# Patient Record
Sex: MALE | Race: BLACK OR AFRICAN AMERICAN | NOT HISPANIC OR LATINO | Employment: STUDENT | URBAN - METROPOLITAN AREA
[De-identification: names, ages, dates, MRNs, and addresses within clinical notes are randomized per-mention and may not be internally consistent; named-entity substitution may affect disease eponyms.]

---
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
## 2022-01-05 ENCOUNTER — OFFICE VISIT (OUTPATIENT)
Dept: OBGYN CLINIC | Facility: OTHER | Age: 22
End: 2022-01-05
Payer: COMMERCIAL

## 2022-01-05 VITALS
SYSTOLIC BLOOD PRESSURE: 128 MMHG | WEIGHT: 220 LBS | HEART RATE: 72 BPM | DIASTOLIC BLOOD PRESSURE: 82 MMHG | HEIGHT: 68 IN | BODY MASS INDEX: 33.34 KG/M2

## 2022-01-05 DIAGNOSIS — Z98.890 S/P ACL RECONSTRUCTION: Primary | ICD-10-CM

## 2022-01-05 PROCEDURE — 3008F BODY MASS INDEX DOCD: CPT | Performed by: ORTHOPAEDIC SURGERY

## 2022-01-05 PROCEDURE — 99213 OFFICE O/P EST LOW 20 MIN: CPT | Performed by: ORTHOPAEDIC SURGERY

## 2022-01-05 PROCEDURE — 1036F TOBACCO NON-USER: CPT | Performed by: ORTHOPAEDIC SURGERY

## 2022-01-05 NOTE — PROGRESS NOTES
Orthopaedic Surgery - Office Note  Kadi Castellano (23 y o  male)   : 2000   MRN: [id number]  Encounter Date: 2022    Chief Complaint   Patient presents with    Left Knee - Pain, Follow-up       Assessment / Plan  S/p left knee ACL reconstruction with BTB autograft and medial meniscus repair (DOS: 21)  Progressing well  · Continue PT and home therapy: ACL protocol, isokinetic quad strengthening  · Continue home stretching and strengthening exercises  · Ok to continue foodball practice; however, continue to avoid contact until PT evaluation   · May use brace as needed   · Return following PT functional evaluation for final determination of clearance status     History of Present Illness  Kadi Castellano is a 25 y o  male who presents f/u S/p left knee ACL reconstruction with BTB autograft and medial meniscus repair (DOS: 21)  Pt reports left knee is feeling well at this point  He notes he is able to complete his workouts without issue  He is using a support brace occasionally but is not reliant on this  Currently he has had limited formal therapy sessions due to his work schedule but has continued team strength workout  He is scheduled to begin agility workouts in the spring  Review of Systems  Pertinent items are noted in HPI  All other systems were reviewed and are negative  Physical Exam  /82 (BP Location: Right arm, Patient Position: Sitting, Cuff Size: Adult)   Pulse 72   Ht 5' 8" (1 727 m)   Wt 99 8 kg (220 lb)   BMI 33 45 kg/m²   Cons: Appears well  No apparent distress  Psych: Alert  Oriented x3  Mood and affect normal   Eyes: PERRLA, EOMI  Resp: Normal effort  No audible wheezing or stridor  CV: Palpable pulse  No discernable arrhythmia  No LE edema  Lymph:  No palpable cervical, axillary, or inguinal lymphadenopathy  Skin: Warm  No palpable masses  No visible lesions  Neuro: Normal muscle tone  Normal and symmetric DTR's       Left Knee Exam  Alignment:  Normal knee alignment  Inspection:  No swelling  No edema  No erythema  Palpation:  No tenderness  ROM:  Knee Extension 0  Knee Flexion 135  Strength:  5/5 quadriceps and hamstrings  Stability:  No objective knee instability  Stable Varus / Valgus stress, Lachman, and Posterior drawer  Tests:  (-) Dominik  Patella:  Patella tracks centrally without crepitus  Neurovascular:  Sensation intact in DP/SP/Hurley/Sa/T nerve distributions  2+ DP & PT pulses  Gait:  Normal     Studies Reviewed  No studies to review    Procedures  No procedures today  Medical, Surgical, Family, and Social History  The patient's medical history, family history, and social history, were reviewed and updated as appropriate      Past Medical History:   Diagnosis Date    Anxiety     "due to surgery"    Left knee injury     "playing football at practice"    Left knee pain     occas    Wears glasses     and also contacts       Past Surgical History:   Procedure Laterality Date    NO PAST SURGERIES      NM KNEE SCOPE,AID ANT CRUCIATE REPAIR Left 1/21/2021    Procedure: ARTHROSCOPIC RECONSTRUCTION ANTERIOR CRUCIATE LIGAMENT (ACL) WITH BTB AUTOGRAFT; MENISCUS REPAIR;  Surgeon: Luis Rodriguez MD;  Location: [facility] OR;  Service: Orthopedics       Family History   Problem Relation Age of Onset    No Known Problems Mother     No Known Problems Father     Breast cancer Maternal Grandmother        Social History     Occupational History    Not on file   Tobacco Use    Smoking status: Never Smoker    Smokeless tobacco: Never Used   Vaping Use    Vaping Use: Never used   Substance and Sexual Activity    Alcohol use: No    Drug use: No    Sexual activity: Not on file       Allergies   Allergen Reactions    Nuts - Food Allergy      almonds    Other Itching     Annotation - [id number]: peaches only  Persimmon- itching    Prunus Persica Hives     Peaches  Also Chick Peas,,  Hummus, mouth itching       No current outpatient medications on file        Didi Worley MD

## 2022-01-10 NOTE — PROGRESS NOTES
PT Evaluation     Today's date: 1/10/2022  Patient name: Gwen Sanchez  : 2000  MRN: [id number]  Referring provider: Courtney Hutchison MD  Dx: No diagnosis found  Assessment  Assessment details: Gwen Sanchez is a pleasant 25 y o  male who presents with ***  The primary movement problem is *** resulting in *** and limiting his ability to {skfunction:96442}  {referral:90065}  I expect he will ***  The patient's greatest concerns are {Concerns:20102::"worry over not knowing what's wrong","fear of not being able to keep active"}  Problem List:  1)   2)    Etiologic factors include {causes; back pain:05360}  Prognosis details: Positive prognostic indicators include {PRECAUTIONS:46776::"positive attitude toward recovery"}  Negative prognostic indicators include {PRECAUTIONS:30900::"chronicity of symptoms","anxiety","hypertension","high symptom irritability","obesity"}  Goals  Patient will be independent with home exercise program    Patient will be able to manage symptoms independently  Subjective Evaluation    History of Present Illness  Mechanism of injury: Patient presents for PT evaluation ~ 1 year post op ACLR  Admits he was not as consistent with PT as he should have been during his rehab process  Also, reports lack of ability to complete home program  States at his most recent follow up with orthopedics he was referred back to PT as he was not compliant with instructions to continue PT at his prior appointments           Objective           Precautions: ACLR      Manuals                                                                 Neuro Re-Ed                                                                                                        Ther Ex                                                                                                                     Ther Activity                                       Gait Training Modalities

## 2022-01-11 ENCOUNTER — EVALUATION (OUTPATIENT)
Dept: PHYSICAL THERAPY | Facility: OTHER | Age: 22
End: 2022-01-11
Payer: COMMERCIAL

## 2022-01-11 DIAGNOSIS — Z98.890 S/P ACL RECONSTRUCTION: ICD-10-CM

## 2022-01-11 PROCEDURE — 97110 THERAPEUTIC EXERCISES: CPT | Performed by: PHYSICAL THERAPIST

## 2022-01-11 PROCEDURE — 97162 PT EVAL MOD COMPLEX 30 MIN: CPT | Performed by: PHYSICAL THERAPIST

## 2022-01-11 NOTE — PROGRESS NOTES
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, glut, strength, secondary to limited rehab (PT/AT, and HEP) following post operative ACLR  Joelle Gray demonstrates decreased quad strength on isokinetic testing, and mild valgus, and trunk lean with hop testing  Joelle Gray 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 from returning to PT to address the deficits listed above to help ensure a safe return to sport and help to prevent future injury  The primary movement problem is knee pain with movement coordination impairments limiting his ability to exercise or recreation and squat to  objects from the floor  No further referral appears necessary at this time based upon examination results  I expect he will respond well to PT intervention if compliance to HEP and attendance in regular PT sessions improves  The patient's greatest concerns are fear of not being able to keep active and future ill health (and wanting to prevent it)  Problem List:  1) decreased quad, glut, LE strength   2) knee pain  Impairments: abnormal or restricted ROM, activity intolerance, impaired physical strength, lacks appropriate home exercise program and pain with function     Impairments: abnormal gait, abnormal muscle tone, abnormal or restricted ROM, activity intolerance, impaired physical strength, lacks appropriate home exercise program, pain with function and poor body mechanics  Prognosis details: Positive prognostic indicators include positive attitude toward recovery   Negative prognostic indicators include chronicity of symptoms, high symptom irritability, lack of compliance with prior PT  Goals  STG's to be achieved in 4 weeks:  1) Patient will demonstrate normal pain free AROM of L knee  2) Patient will improve total work by 100# on isokinetic testing  3) Patient will be compliant with strength and conditioning at minimum 2 x a week in addition to PT      LTG's to be achieved in 8 weeks:  1) Patient will demonstrate normal LE mechanics with SL hopping  2) Patient will report no greater than 2/10 knee pain with deep squatting  3) Patient will report no greater than 2/10 knee pain with running, agility activity  4) Patient will score 80 or greater on FOTO  Plan  Frequency: 2x week  Duration in weeks: 8        Subjective Evaluation    History of Present Illness  Mechanism of injury: Patient presents for PT evaluation ~ 1 year post op ACLR  Admits he was not as consistent with PT as he could have been during his rehab process  Also, reports lack of ability to complete home program  States 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, limiting his ability to attend PT, AT or strength and conditioning team sessions  States at his most recent follow up with orthopedics he was referred back to PT as he was not compliant with instructions to continue PT at his prior appointments       Pain  Current pain ratin  At best pain ratin  At worst pain ratin    Patient Goals  Patient goals for therapy: increased strength, return to sport/leisure activities and increased motion          Objective     Functional Assessment      6M Timed Hop   Left:   Trial 1: 2 seconds  Trial 2: 2 seconds  Trial 3: 2 seconds  Right:   Trial 1: 2 seconds  Trial 2: 3 seconds  Trial 3: 2 seconds  Crossover Hop    Left:   Trial 1: 161 seconds   Trial 2: 161 seconds   Trial 3: 163 seconds   Right:   Trial 1: 168 seconds   Trial 2: 168 seconds   Trial 3: 172 seconds   Single hip distance   Left:   Trial 1: 48 seconds  Trial 2: 52 seconds  Trial 2: 48 seconds  Right:   Trial 1: 72 seconds  Trial 2: 60 seconds  Trial 3: 65 seconds  Triple hop distance   Left:   Trial 1: 156 seconds  Trial 2 156 seconds  Trial 3: 161 seconds  Right:   Trial 1: 168 seconds  Trial 2: 161 seconds  Trial 3: 161 seconds    General Comments:      Knee Comments  Trunk lean with all hop testing, however, demonstrates little valgus, and excessive pronation    Isokinetic testing- demonstrates 20-15% quad deficit at 60pps, 180, and 300, 15-10% hamstring deficit at this same speeds                Precautions: ACLR       Manuals 1/11                                                                Neuro Re-Ed             circuit Surge  Front squat, alt lunge, RDL  10 ea 3x              Split squat jumps             Agility ladder                                                                 Ther Ex                                                                                                                     Ther Activity                                       Gait Training                                       Modalities

## 2022-01-14 ENCOUNTER — OFFICE VISIT (OUTPATIENT)
Dept: PHYSICAL THERAPY | Facility: OTHER | Age: 22
End: 2022-01-14
Payer: COMMERCIAL

## 2022-01-14 DIAGNOSIS — Z98.890 S/P ACL RECONSTRUCTION: Primary | ICD-10-CM

## 2022-01-14 PROCEDURE — 97530 THERAPEUTIC ACTIVITIES: CPT | Performed by: PHYSICAL THERAPIST

## 2022-01-14 PROCEDURE — 97110 THERAPEUTIC EXERCISES: CPT | Performed by: PHYSICAL THERAPIST

## 2022-01-14 PROCEDURE — 97112 NEUROMUSCULAR REEDUCATION: CPT | Performed by: PHYSICAL THERAPIST

## 2022-01-14 NOTE — PROGRESS NOTES
Daily Note     Today's date: 2022  Patient name: Liliana Arriaga  : 2000  MRN: [id number]  Referring provider: Chuck Bauman MD  Dx:   Encounter Diagnosis     ICD-10-CM    1  S/P ACL reconstruction  G30 155        Start Time: 447  Stop Time:   Total time in clinic (min): 55 minutes  1 on 1 for entirety    Subjective: Patient reports he was a little sore after resuming PT last visit  Objective: See treatment diary below      Assessment: Tolerated treatment well  Patient demonstrated fatigue post treatment, exhibited good technique with therapeutic exercises and 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 from continued PT  Patient very easily fatigued with resuming cardio, agility and higher level strengthening this visit  However, able to demonstrate improved short bouts of power with frequent rest breaks  Soreness noted post plyometric activity  Reporting he felt as though his knee needed to crack  Relieved post stretching  Plan: Continue per plan of care        Precautions: ACLR       Manuals                                                                Neuro Re-Ed             circuit Surge  Front squat, alt lunge, RDL  10 ea 3x   Surge   Front squat  Alt lunge fwd  Curtsy lunge b/l    All 10 x    3x           Split squat/ jumps  10 split squats 11# plates  3 split squat jumps    3x           backsquat                                                                   Ther Ex             bike  10'            Calf stretch                                                                                           Ther Activity             Active warm up, tin soldier, piriformis stretch, skips  2 laps ea           circuit blazepod agility ladder  backsquat bar+ 45 x 10    3x                         Gait Training                                       Modalities

## 2022-01-18 ENCOUNTER — OFFICE VISIT (OUTPATIENT)
Dept: PHYSICAL THERAPY | Facility: OTHER | Age: 22
End: 2022-01-18
Payer: COMMERCIAL

## 2022-01-18 DIAGNOSIS — Z98.890 S/P ACL RECONSTRUCTION: Primary | ICD-10-CM

## 2022-01-18 PROCEDURE — 97112 NEUROMUSCULAR REEDUCATION: CPT

## 2022-01-18 PROCEDURE — 97110 THERAPEUTIC EXERCISES: CPT

## 2022-01-18 NOTE — PROGRESS NOTES
Daily Note     Today's date: 2022  Patient name: Gwen Sanchez  : 2000  MRN: [id number]  Referring provider: Courtney Hutchison MD  Dx:   Encounter Diagnosis     ICD-10-CM    1  S/P ACL reconstruction  [id number]                     Subjective: Pt states he was sore from his last PT session  Objective: See treatment diary below      Assessment: Tolerated treatment well with no increase in pain  Pt demonstrates good from with squat exercises  Pt is challenged with SLS strength seen with lunges  Pt required VC for glut  Patient 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 from continued PT  Ogden Organ Plan: Continue per plan of care        Precautions: ACLR       Manuals                                            Neuro Re-Ed         Superset    30" side plank ea; 30" mountain climbers x 3       Superset    3 x 10 ea DB 50# box squats; reverse lunges with BB + 22# plates          Supersets   3 x 10  Split  Squats; SL dead lift        circuit Surge  Front squat, alt lunge, RDL  10 ea 3x   Surge   Front squat  Alt lunge fwd  Curtsy lunge b/l    All 10 x    3x          Split squat/ jumps  10 split squats 11# plates  3 split squat jumps    3x 3 split squat jumps   X 3       backsquat     NV       X walks    MTB   2 laps        leg press pyramid    190#, 170#, 150#, 130#,  110#,  130#,   150#,  170#,  190#  to failure ea                        Ther Ex         bike  10'  10'       Calf stretch                                                               Ther Activity         Active warm up, tin soldier, piriformis stretch, skips  2 laps ea 2 laps ea       circuit blazepod agility ladder  backsquat bar+ 45 x 10    3x                 Gait Training                           Modalities

## 2022-01-21 ENCOUNTER — OFFICE VISIT (OUTPATIENT)
Dept: PHYSICAL THERAPY | Facility: OTHER | Age: 22
End: 2022-01-21
Payer: COMMERCIAL

## 2022-01-21 DIAGNOSIS — Z98.890 S/P ACL RECONSTRUCTION: Primary | ICD-10-CM

## 2022-01-21 PROCEDURE — 97112 NEUROMUSCULAR REEDUCATION: CPT | Performed by: PHYSICAL THERAPIST

## 2022-01-21 PROCEDURE — 97110 THERAPEUTIC EXERCISES: CPT | Performed by: PHYSICAL THERAPIST

## 2022-01-21 NOTE — PROGRESS NOTES
Daily Note     Today's date: 2022  Patient name: Orestes Mcclendon  : 2000  MRN: [id number]  Referring provider: Katharine Restrepo MD  Dx:   Encounter Diagnosis     ICD-10-CM    1  S/P ACL reconstruction  Z98 890                   1 on 1 from 5122-5604, not billed remainder    Subjective: Pt reports muscle soreness from last session  Denies any knee pain  Objective: See treatment diary below      Assessment: Tolerated treatment well with no increase in pain  Patient 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 from continued PT  Continued with comprehensive program including glut, core strengthening, plyometric and agility loading of his post-operative knee with fatigue  Plan: Continue per plan of care        Precautions: ACLR       Manuals                                          Neuro Re-Ed         Superset    30" side plank ea; 30" mountain climbers x 3  30" side plank from knee with hip abd  30" mountain climber  X 4     Superset    3 x 10 ea DB 50# box squats; reverse lunges with BB + 22# plates          Supersets   3 x 10  Split  Squats; SL dead lift        circuit Surge  Front squat, alt lunge, RDL  10 ea 3x   Surge   Front squat  Alt lunge fwd  Curtsy lunge b/l    All 10 x    3x     Surge  Front squat, alt lunge, RDL  10 ea 3x     Split squat/ jumps  10 split squats 11# plates  3 split squat jumps    3x 3 split squat jumps   X 3       backsquat     NV  Bar +45   4 x 10      X walks    MTB   2 laps        leg press pyramid    190#, 170#, 150#, 130#,  110#,  130#,   150#,  170#,  190#  to failure ea 190#, 170#, 150#, 130# 150#, 170#, 190# to failure ea                        Ther Ex         bike  10'  10'  10'      Calf stretch         Hamstring quad stretch    4 x 30" ea                                                  Ther Activity         Active warm up, tin soldier, piriformis stretch, skips  2 laps ea 2 laps ea  2 laps ea     circuit blazepod agility ladder  backsquat bar+ 45 x 10    3x Gait Training                           Modalities

## 2022-01-25 ENCOUNTER — OFFICE VISIT (OUTPATIENT)
Dept: PHYSICAL THERAPY | Facility: OTHER | Age: 22
End: 2022-01-25
Payer: COMMERCIAL

## 2022-01-25 DIAGNOSIS — Z98.890 S/P ACL RECONSTRUCTION: Primary | ICD-10-CM

## 2022-01-25 PROCEDURE — 97112 NEUROMUSCULAR REEDUCATION: CPT

## 2022-01-25 PROCEDURE — 97530 THERAPEUTIC ACTIVITIES: CPT

## 2022-01-25 PROCEDURE — 97140 MANUAL THERAPY 1/> REGIONS: CPT

## 2022-01-25 PROCEDURE — 97110 THERAPEUTIC EXERCISES: CPT

## 2022-01-25 NOTE — PROGRESS NOTES
Daily Note     Today's date: 2022  Patient name: Quincy Cortez  : 2000  MRN: [id number]  Referring provider: Fausto Holly MD  Dx:   Encounter Diagnosis     ICD-10-CM    1  S/P ACL reconstruction  Z98 890                    1 on 1 PTA       Subjective: Pt reports muscle soreness from last session  Denies any knee pain  Objective: See treatment diary below      Assessment: Tolerated treatment well with no increase in pain  Patient able to successfully complete circuits involving strength, speed and light power movements  VC required for power movements mainly to correct motor patterns, but no problem with resistance  and  Patient 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 from 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  Plan: Continue per plan of care        Precautions: ACLR       Manuals     MFD     MP                               Neuro Re-Ed         Superset    30" side plank ea; 30" mountain climbers x 3  30" side plank from knee with hip abd  30" mountain climber  X 4     Superset    3 x 10 ea DB 50# box squats; reverse lunges with BB + 22# plates          Supersets   3 x 10  Split  Squats; SL dead lift        circuit Surge  Front squat, alt lunge, RDL  10 ea 3x   Surge   Front squat  Alt lunge fwd  Curtsy lunge b/l    All 10 x    3x     Surge  Front squat, alt lunge, RDL  10 ea 3x     Split squat/ jumps  10 split squats 11# plates  3 split squat jumps    3x 3 split squat jumps   X 3       backsquat     NV  Bar +45   4 x 10      X walks    MTB   2 laps        leg press pyramid    190#, 170#, 150#, 130#,  110#,  130#,   150#,  170#,  190#  to failure ea 190#, 170#, 150#, 130# 150#, 170#, 190# to failure ea                        Ther Ex         bike  10'  10'  10'  10'    Calf stretch         Hamstring quad stretch    4 x 30" ea 4 x 30"    Quad stretch     4 x 30"                                        Ther Activity         Active warm up, tin soldier, piriformis stretch, skips  2 laps ea 2 laps ea  2 laps ea     circuit blazepod agility ladder  backsquat bar+ 45 x 10    3x    5x    Leg Press 240-270lbs 5x    Curve Sprint x 15s    Russian HSC x 10    1-3 min rest          Circuit 2     3x  Iso Bengali SS 25# x1 ea     5 Max effort squat jump    8 HPC with Barbell     Gait Training                           Modalities         EPAT     Quad, HS small gold head    2000 pulses ea

## 2022-01-28 ENCOUNTER — OFFICE VISIT (OUTPATIENT)
Dept: PHYSICAL THERAPY | Facility: OTHER | Age: 22
End: 2022-01-28
Payer: COMMERCIAL

## 2022-01-28 DIAGNOSIS — Z98.890 S/P ACL RECONSTRUCTION: Primary | ICD-10-CM

## 2022-01-28 PROCEDURE — 97112 NEUROMUSCULAR REEDUCATION: CPT

## 2022-01-28 PROCEDURE — 97140 MANUAL THERAPY 1/> REGIONS: CPT

## 2022-01-28 NOTE — PROGRESS NOTES
Daily Note     Today's date: 2022  Patient name: Margot Tam  : 2000  MRN: [id number]  Referring provider: Cj Craig MD  Dx:   Encounter Diagnosis     ICD-10-CM    1  S/P ACL reconstruction  [id number]                   Subjective: Patient reports mild soreness since LV  Objective: See treatment diary below      Assessment: Tolerated treatment well  Patient demonstrated good power and control throughout session  He continues to benefit from MFD for restriction release  Patient demonstrated fatigue post session and 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 from continued PT  Plan: Continue per plan of care         Precautions: ACLR       Manuals    MFD     MP MSB                              Neuro Re-Ed         Superset    30" side plank ea; 30" mountain climbers x 3  30" side plank from knee with hip abd  30" mountain climber  X 4     Superset    3 x 10 ea DB 50# box squats; reverse lunges with BB + 22# plates          Supersets   3 x 10  Split  Squats; SL dead lift        circuit Surge  Front squat, alt lunge, RDL  10 ea 3x   Surge   Front squat  Alt lunge fwd  Curtsy lunge b/l    All 10 x    3x     Surge  Front squat, alt lunge, RDL  10 ea 3x     Split squat/ jumps  10 split squats 11# plates  3 split squat jumps    3x 3 split squat jumps   X 3       backsquat     NV  Bar +45   4 x 10      X walks    MTB   2 laps        leg press pyramid    190#, 170#, 150#, 130#,  110#,  130#,   150#,  170#,  190#  to failure ea 190#, 170#, 150#, 130# 150#, 170#, 190# to failure ea                        Ther Ex         bike  10'  10'  10'  10' 10'   Calf stretch         Hamstring quad stretch    4 x 30" ea 4 x 30" 4 x 30"   Quad stretch     4 x 30" 4 x 30"                                       Ther Activity         Active warm up, tin soldier, piriformis stretch, skips  2 laps ea 2 laps ea  2 laps ea     circuit blazepod agility ladder  backsquat bar+ 45 x 10    3x    5x    Leg Press 240-270lbs 5x    Curve Sprint x 15s    Russian HSC x 10    1-3 min rest       5x    Leg Press 240-270lbs 5x    Curve Sprint x 15s    Russian HSC x 10    1-3 min rest   Circuit 2     3x  Iso Swedish SS 25# x1 ea     5 Max effort squat jump    8 HPC with Barbell  3x  Iso Swedish SS 25# x1 ea     5 Max effort squat jump    HS stool scoots 3 laps   Gait Training                           Modalities         EPAT     Quad, HS small gold head    2000 pulses ea  Quad, HS small gold head    2000 pulses ea

## 2022-02-01 ENCOUNTER — OFFICE VISIT (OUTPATIENT)
Dept: PHYSICAL THERAPY | Facility: OTHER | Age: 22
End: 2022-02-01
Payer: COMMERCIAL

## 2022-02-01 DIAGNOSIS — Z98.890 S/P ACL RECONSTRUCTION: Primary | ICD-10-CM

## 2022-02-01 PROCEDURE — 97112 NEUROMUSCULAR REEDUCATION: CPT | Performed by: PHYSICAL THERAPIST

## 2022-02-01 PROCEDURE — 97110 THERAPEUTIC EXERCISES: CPT | Performed by: PHYSICAL THERAPIST

## 2022-02-01 PROCEDURE — 97140 MANUAL THERAPY 1/> REGIONS: CPT | Performed by: PHYSICAL THERAPIST

## 2022-02-01 NOTE — PROGRESS NOTES
Daily Note     Today's date: 2022  Patient name: Emiliana Turner  : 2000  MRN: [id number]  Referring provider: Debra Chavis MD  Dx:   Encounter Diagnosis     ICD-10-CM    1  S/P ACL reconstruction  [id number]                   Subjective: Patient reports some joint line soreness with impact activity  Reports overall muscle soreness from workout on his own prior this week  Objective: See treatment diary below      Assessment: Tolerated treatment well  Patient continues to be challenged with higher level activity  Demonstrating tightness in b/l hamstrings and mild joint line soreness of medial joint line  Joy strengthening this visit due to patient completing lower body program on his own  Therefore, focused on core, and plyometric, agility activity  Plan: Continue per plan of care         Precautions: ACLR       Manuals    MFD    MP MSB EB hamstring                              Neuro Re-Ed         Superset   30" side plank ea; 30" mountain climbers x 3  30" side plank from knee with hip abd  30" mountain climber  X 4   1' slideboard  10" mountain climber  10 surge front squats  3 laps side shuffle hurdles  3 x     Superset   3 x 10 ea DB 50# box squats; reverse lunges with BB + 22# plates        3 laps bear crawl    10 box jumps, 18"    12 pistol squats    3 x   Supersets  3 x 10  Split  Squats; SL dead lift         circuit Surge   Front squat  Alt lunge fwd  Curtsy lunge b/l    All 10 x    3x     Surge  Front squat, alt lunge, RDL  10 ea 3x      Split squat/ jumps 10 split squats 11# plates  3 split squat jumps    3x 3 split squat jumps   X 3        backsquat  NV  Bar +45   4 x 10       X walks   MTB   2 laps         leg press pyramid   190#, 170#, 150#, 130#,  110#,  130#,   150#,  170#,  190#  to failure ea 190#, 170#, 150#, 130# 150#, 170#, 190# to failure ea                         Ther Ex         bike 10'  10'  10'  10' 10' 5'    Calf stretch         Hamstring quad stretch   4 x 30" ea 4 x 30" 4 x 30" 4 x 30"   Quad stretch    4 x 30" 4 x 30" 4 x 30"   Sciatic nerve glide      3 x 10   Supine sciatic nerve glide      10 x                     Ther Activity         Active warm up, tin soldier, piriformis stretch, skips 2 laps ea 2 laps ea  2 laps ea      circuit    5x    Leg Press 240-270lbs 5x    Curve Sprint x 15s    Russian HSC x 10    1-3 min rest       5x    Leg Press 240-270lbs 5x    Curve Sprint x 15s    Russian HSC x 10    1-3 min rest    Circuit 2    3x  Iso Macanese SS 25# x1 ea     5 Max effort squat jump    8 HPC with Barbell  3x  Iso Macanese SS 25# x1 ea     5 Max effort squat jump    HS stool scoots 3 laps    Gait Training                           Modalities         EPAT    Quad, HS small gold head    2000 pulses ea  Quad, HS small gold head    2000 pulses ea   HS gold 2000 pulse 3 8

## 2022-02-04 ENCOUNTER — EVALUATION (OUTPATIENT)
Dept: PHYSICAL THERAPY | Facility: OTHER | Age: 22
End: 2022-02-04
Payer: COMMERCIAL

## 2022-02-04 PROCEDURE — 97110 THERAPEUTIC EXERCISES: CPT | Performed by: PHYSICAL THERAPIST

## 2022-02-04 NOTE — PROGRESS NOTES
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 after resuming formal PT 1 month ago with decreased quad, glut, strength, secondary to limited rehab (PT/AT, and HEP) following post operative ACL reconstruction  At this time he demonstrates improvement ins isokinetic testing results showing 10% greater quad strength on his involved versus uninvolved LE  He also demonstrates improved neuromuscular control with hop testing and hop testing within 90% of the uninvolved LE  At this time he demonstrates compliance with strength and conditioning and regularly attending AT rehab appointments on time  Discussed d/c to strength and conditioning at minimum of 2 x a week and 2x a week sport progression with team AT  I am happy to follow up with Melanie Méndez should any future problems arise  Impairments: abnormal gait, abnormal muscle tone, abnormal or restricted ROM, activity intolerance, impaired physical strength, lacks appropriate home exercise program, pain with function and poor body mechanics  Prognosis details: Positive prognostic indicators include positive attitude toward recovery  Negative prognostic indicators include chronicity of symptoms, high symptom irritability, lack of compliance with prior PT  Goals  STG's to be achieved in 4 weeks:  1) Patient will demonstrate normal pain free AROM of L knee   - met  2) Patient will improve total work by 100# on isokinetic testing  -met  3) Patient will be compliant with strength and conditioning at minimum 2 x a week in addition to PT  - met    LTG's to be achieved in 8 weeks:  1) Patient will demonstrate normal LE mechanics with SL hopping  - met  2) Patient will report no greater than 2/10 knee pain with deep squatting  -met  3) Patient will report no greater than 2/10 knee pain with running, agility activity  - partially met   4) Patient will score 80 or greater on FOTO  - met             Subjective Evaluation    History of Present Illness  Mechanism of injury: Patient presents for PT evaluation ~ 1 year post op ACLR  Admits he was not as consistent with PT as he could have been during his rehab process  Also, reports lack of ability to complete home program  States 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, limiting his ability to attend PT, AT or strength and conditioning team sessions  States at his most recent follow up with orthopedics he was referred back to PT as he was not compliant with instructions to continue PT at his prior appointments       Pain  Current pain ratin  At best pain ratin  At worst pain ratin    Patient Goals  Patient goals for therapy: increased strength, return to sport/leisure activities and increased motion          Objective     Functional Assessment      6M Timed Hop   Left:   Trial 1: 2 seconds  Trial 2: 2 seconds  Trial 3: 2 seconds  Right:   Trial 1: 2 seconds  Trial 2: 2 seconds  Trial 3: 2 seconds  Crossover Hop    Left:   Trial 1: 165 seconds   Trial 2: 162 seconds   Trial 3: 162 seconds   Right:   Trial 1: 168 seconds   Trial 2: 180 seconds   Trial 3: 180 seconds   Single hip distance   Left:   Trial 1: 68 seconds  Trial 2: 72 seconds  Trial 2: 70 seconds  Right:   Trial 1: 72 seconds  Trial 2: 70 seconds  Trial 3: 72 seconds  Triple hop distance   Left:   Trial 1: 180 seconds  Trial 2 186 seconds  Trial 3: 180 seconds  Right:   Trial 1: 192 seconds  Trial 2: 194 seconds  Trial 3: 192 seconds    General Comments:      Knee Comments  Trunk lean with all hop testing, however, demonstrates little valgus, and excessive pronation                 Precautions: ACLR     Manuals    MFD    MP MSB EB hamstring    isoketics 10   Hop testing       5             Neuro Re-Ed          Superset   30" side plank ea; 30" mountain climbers x 3  30" side plank from knee with hip abd  30" mountain climber  X 4   1' slideboard  10" mountain climber  10 surge front squats  3 laps side shuffle hurdles  3 x      Superset   3 x 10 ea DB 50# box squats; reverse lunges with BB + 22# plates        3 laps bear crawl    10 box jumps, 18"    12 pistol squats    3 x    Supersets  3 x 10  Split  Squats; SL dead lift          circuit Surge   Front squat  Alt lunge fwd  Curtsy lunge b/l    All 10 x    3x     Surge  Front squat, alt lunge, RDL  10 ea 3x       Split squat/ jumps 10 split squats 11# plates  3 split squat jumps    3x 3 split squat jumps   X 3         backsquat  NV  Bar +45   4 x 10        X walks   MTB   2 laps          leg press pyramid   190#, 170#, 150#, 130#,  110#,  130#,   150#,  170#,  190#  to failure ea 190#, 170#, 150#, 130# 150#, 170#, 190# to failure ea                            Ther Ex          bike 10'  10'  10'  10' 10' 5'  5'   Calf stretch       4 x 30"   Hamstring quad stretch   4 x 30" ea 4 x 30" 4 x 30" 4 x 30" 4 x 30"   Quad stretch    4 x 30" 4 x 30" 4 x 30" 4 x 30"   Sciatic nerve glide      3 x 10 3 x 10   Supine sciatic nerve glide      10 x 10 x 10"                       Ther Activity          Active warm up, tin soldier, piriformis stretch, skips 2 laps ea 2 laps ea  2 laps ea       circuit    5x    Leg Press 240-270lbs 5x    Curve Sprint x 15s    Russian HSC x 10    1-3 min rest       5x    Leg Press 240-270lbs 5x    Curve Sprint x 15s    Russian HSC x 10    1-3 min rest     Circuit 2    3x  Iso Korean SS 25# x1 ea     5 Max effort squat jump    8 HPC with Barbell  3x  Iso Korean SS 25# x1 ea     5 Max effort squat jump    HS stool scoots 3 laps     Gait Training                              Modalities          EPAT    Quad, HS small gold head    2000 pulses ea  Quad, HS small gold head    2000 pulses ea   HS gold 2000 pulse 3 8

## 2022-03-16 ENCOUNTER — OFFICE VISIT (OUTPATIENT)
Dept: OBGYN CLINIC | Facility: OTHER | Age: 22
End: 2022-03-16
Payer: COMMERCIAL

## 2022-03-16 VITALS
HEIGHT: 68 IN | BODY MASS INDEX: 33.34 KG/M2 | SYSTOLIC BLOOD PRESSURE: 136 MMHG | DIASTOLIC BLOOD PRESSURE: 81 MMHG | HEART RATE: 66 BPM | WEIGHT: 220 LBS

## 2022-03-16 DIAGNOSIS — S83.512D RUPTURE OF ANTERIOR CRUCIATE LIGAMENT OF LEFT KNEE, SUBSEQUENT ENCOUNTER: Primary | ICD-10-CM

## 2022-03-16 PROCEDURE — 99213 OFFICE O/P EST LOW 20 MIN: CPT | Performed by: ORTHOPAEDIC SURGERY

## 2022-03-16 PROCEDURE — 1036F TOBACCO NON-USER: CPT | Performed by: ORTHOPAEDIC SURGERY

## 2022-03-16 PROCEDURE — 3008F BODY MASS INDEX DOCD: CPT | Performed by: ORTHOPAEDIC SURGERY

## 2022-03-16 NOTE — PROGRESS NOTES
Orthopaedic Surgery - Office Note  Nahomy Martinez (89 y o  male)   : 2000   MRN: [id number]  Encounter Date: 3/16/2022    Chief Complaint   Patient presents with    Left Knee - Follow-up       Assessment / Plan  S/p left knee ACL reconstruction with BTB autograft and medial meniscus repair with good progression     · Activity as tolerated   · Fully cleared to return to sports, note given today   · ACL functional brace prn  · Patient did well with is isokentic testing and hop test  Return if symptoms worsen or fail to improve  History of Present Illness  Nahomy Martinez is a 25 y o  male who presents for follow up S/p left knee ACL reconstruction with BTB autograft and medial meniscus repair on 2021  He did return to PT and received final clearance passing his isokinetic testing and hop tests  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  He is wearing an ACL functional brace  He denies any instability  He continues to be happy with his overall surgical outcomes  Review of Systems  Pertinent items are noted in HPI  All other systems were reviewed and are negative  Physical Exam  /81   Pulse 66   Ht 5' 8" (1 727 m)   Wt 99 8 kg (220 lb)   BMI 33 45 kg/m²   Cons: Appears well  No apparent distress  Psych: Alert  Oriented x3  Mood and affect normal   Eyes: PERRLA, EOMI  Resp: Normal effort  No audible wheezing or stridor  CV: Palpable pulse  No discernable arrhythmia  No LE edema  Lymph:  No palpable cervical, axillary, or inguinal lymphadenopathy  Skin: Warm  No palpable masses  No visible lesions  Neuro: Normal muscle tone  Normal and symmetric DTR's  Left Knee Exam  Alignment:  Normal knee alignment  Inspection:  No swelling  No ecchymosis  Palpation:  No tenderness  No effusion  ROM:  Knee Extension 0  Knee Flexion 135  Strength:  Able to SLR without lag  Able to actively extend knee against gravity  Stability:  (-) Lachman   (-) Pivot-shift  Tests:  No pertinent positive or negative tests  Patella:  Normal patellar mobility  Neurovascular:  Sensation intact in DP/SP/Hurley/Sa/T nerve distributions  2+ DP & PT pulses  Gait:  Normal     Studies Reviewed  No studies to review    Procedures  No procedures today  Medical, Surgical, Family, and Social History  The patient's medical history, family history, and social history, were reviewed and updated as appropriate  Past Medical History:   Diagnosis Date    Anxiety     "due to surgery"    Left knee injury     "playing football at practice"    Left knee pain     occas    Wears glasses     and also contacts       Past Surgical History:   Procedure Laterality Date    NO PAST SURGERIES      NV KNEE SCOPE,AID ANT CRUCIATE REPAIR Left 1/21/2021    Procedure: ARTHROSCOPIC RECONSTRUCTION ANTERIOR CRUCIATE LIGAMENT (ACL) WITH BTB AUTOGRAFT; MENISCUS REPAIR;  Surgeon: Seble Mcqueen MD;  [facility];  Service: Orthopedics       Family History   Problem Relation Age of Onset    No Known Problems Mother     No Known Problems Father     Breast cancer Maternal Grandmother        Social History     Occupational History    Not on file   Tobacco Use    Smoking status: Never Smoker    Smokeless tobacco: Never Used   Vaping Use    Vaping Use: Never used   Substance and Sexual Activity    Alcohol use: No    Drug use: No    Sexual activity: Not on file       Allergies   Allergen Reactions    Nuts - Food Allergy      almonds    Other Itching     Annotation - [id number]: peaches only  Persimmon- itching    Prunus Persica Hives     Peaches  Also Chick Peas,,  Hummus, mouth itching       No current outpatient medications on file        Maritza Balloon    Scribe Attestation    I,:  Maritza Balloon am acting as a scribe while in the presence of the attending physician :       I,:  Lanny Dumont MD personally performed the services described in this documentation    as scribed in my presence :

## 2022-03-16 NOTE — LETTER
March 16, 2022     Patient: Margy Etienne   YOB: 2000   Date of Visit: 3/16/2022       To Whom it May Concern:    Margy Etienne is under my professional care  He was seen in my office on 3/16/2022  He may return to gym class or sports on 03/16/2022  no restrictions  If you have any questions or concerns, please don't hesitate to call           Sincerely,          Mark Goodpasture, MD        CC: No Recipients

## 2022-04-15 NOTE — PROGRESS NOTES
Daily Note     Today's date: 2021  Patient name: Jerome Haley  : 2000  MRN: [id number]  Referring provider: Oscar Van MD  Dx:   Encounter Diagnosis     ICD-10-CM    1  Rupture of anterior cruciate ligament of left knee, initial encounter  S83 512A        Start Time: 3902        1 on 1 with PT from [phone], not billed remainder    Subjective: Patient offers no complaints  Reports fatigue post PT as expected  Admits he could be more compliant with home program        Objective: See treatment diary below      Assessment: Tolerated treatment well  Initiated full weightbearing running on treadmill  Patient requiring cueing for improved hamstring kickback and quad drive forward with running  Patient easily fatigued from a cardiovascular standpoint  Patient continues to be easily fatigued with current strengthening program  Discussed adding strength and conditioning in to his home routine with campus at minimum 1-2 x a week  Patient instructed to hold agility and plyometrics with S&C  Patient also encourage to reproduce PT session (squat, leg ext curls, legpress) in the gym on campus at minimum 1 x a week  Patient demonstrated fatigue post treatment, exhibited good technique with therapeutic exercises and 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 from continued PT  Plan: Continue per plan of care           Precautions: ACL reconstruction, BPTB,  meniscal repair, smyth ROM to 90 first 4 weeks      Manuals    PROM         Quad stretch         IASTM     MP    Scar massage         KT tape         MFD         EPAT 86987 pulses plastic head 3 5        Neuro Re-Ed         squat      Surge 3 x 10    lunge      Surge 3 x 10    Lateral step over bosu ball         bosu ball  Squat and hold    3 x 1' ball toss     Mini squat         Step up         Legpress         SLS         Star slider         Lateral step down         [place] hamstring curls         Pistol squat with TRX  SL Box Squat 3 x 10  SL box squat 3 x 10 SL box squat 3 x 10      Single leg hip abd mira         Assisted Sissy Squats     Bungee 3 x 10    Back squat    3 x 10 bar     Bird dip  3 x 10, 30#    Ball drop blue MB 2 x 10     Reverse Lunge R + L, to lateral step up         [place] spilt squat 3 x 10 35# 3 x 10 35#       Step up reverse lunge 35# 3 x 10        Elevated quadruped pull through         Elevated bird dog Regular 3 x 8        blazepod  plank focus                  Ther Ex         Bike  10' 5'  5'  5'  5' 5'   Calf stretch heel propped   SB 4 x 30" SB 4 x 30" SB 4 x 30"    Quad stretch 4 x 30" 4 x 30"       Hamstring stretch 4 x 30" 4 x 30"       Heel slide         Ankle pumps         LAQ 90-60         HR, TR         Side Plank modified with hip abd               DL 2 x 1' fwd, lat   Ther Activity      2 x 15   Line jump         Squat, vertical  2 x 10     2' jog, 1' walk   TM         AlterG running XL short NP- Not working     60% 1' walk, 1 5' jog x5 65% 2' jog, 30 sec walk x 5 65% 2' jog, 30 sec walk x 5'      Vertical alt  2 x 10 8" step  2 x 10, 12"     Hopping   AP/ML 30s       circuit  RDL, walking lunge, goblet squat walking lunge- all 35# 3 x 10 reps ea   Black CLX side step    Black CLX reverse monster walk    Walking Lunge  2, 25# DB    30s Side Plank with hip ABD Split squat, 25# step up with knee drive, 54# bear crawls fwd/back 3x    3x  4/2/1 Surge Squat Max reps 20, 22    15s Iso [place] SS x2 ea   Leg    Power Step ups x 15 ea  leg Split squat- 10 x, RDL- 10 x, walking lunge 2 laps  3 x ea    Step up reverse lunge, curtsey lunge  Goblet squat all 10 rep    3x ea       Gait Training                           Modalities No

## 2022-07-27 ENCOUNTER — TELEPHONE (OUTPATIENT)
Dept: OTHER | Facility: OTHER | Age: 22
End: 2022-07-27

## 2022-07-27 DIAGNOSIS — Z13.0 ENCOUNTER FOR SICKLE-CELL SCREENING: Primary | ICD-10-CM

## 2022-07-27 NOTE — TELEPHONE ENCOUNTER
I can order it during his physical next month or does he need to get done now?      Dr Bobo Rodriguez

## 2022-07-27 NOTE — TELEPHONE ENCOUNTER
Patient of Dr Shameka Johnston calling stating he goes to [facility], and the [facility] is asking if they can submit records of sickle cell testing/results  He states he is unsure if hes ever had such testing done and is asking how to go about this   Please advise [phone]

## 2022-08-03 ENCOUNTER — APPOINTMENT (OUTPATIENT)
Dept: LAB | Facility: HOSPITAL | Age: 22
End: 2022-08-03
Payer: COMMERCIAL

## 2022-08-03 DIAGNOSIS — Z13.0 SCREENING FOR IRON DEFICIENCY ANEMIA: ICD-10-CM

## 2022-08-03 PROCEDURE — 85660 RBC SICKLE CELL TEST: CPT

## 2022-08-03 PROCEDURE — 36415 COLL VENOUS BLD VENIPUNCTURE: CPT

## 2022-08-05 LAB — SICKLE CELLS BLD QL SMEAR: NEGATIVE

## 2022-08-08 ENCOUNTER — OFFICE VISIT (OUTPATIENT)
Dept: FAMILY MEDICINE CLINIC | Facility: CLINIC | Age: 22
End: 2022-08-08
Payer: COMMERCIAL

## 2022-08-08 VITALS
DIASTOLIC BLOOD PRESSURE: 82 MMHG | BODY MASS INDEX: 31.07 KG/M2 | WEIGHT: 209.8 LBS | HEART RATE: 74 BPM | TEMPERATURE: 96.9 F | HEIGHT: 69 IN | OXYGEN SATURATION: 99 % | SYSTOLIC BLOOD PRESSURE: 138 MMHG | RESPIRATION RATE: 12 BRPM

## 2022-08-08 DIAGNOSIS — E66.09 CLASS 1 OBESITY DUE TO EXCESS CALORIES WITHOUT SERIOUS COMORBIDITY WITH BODY MASS INDEX (BMI) OF 31.0 TO 31.9 IN ADULT: ICD-10-CM

## 2022-08-08 DIAGNOSIS — R03.0 ELEVATED BLOOD PRESSURE READING: ICD-10-CM

## 2022-08-08 DIAGNOSIS — Z00.00 ANNUAL PHYSICAL EXAM: Primary | ICD-10-CM

## 2022-08-08 PROCEDURE — 99395 PREV VISIT EST AGE 18-39: CPT | Performed by: FAMILY MEDICINE

## 2022-08-08 PROCEDURE — 3725F SCREEN DEPRESSION PERFORMED: CPT | Performed by: FAMILY MEDICINE

## 2022-08-08 NOTE — PROGRESS NOTES
[address] [facility]    NAME: Gwen Sanchez  AGE: 23 y o  SEX: male  : 2000     DATE: 2022     Assessment and Plan:     Problem List Items Addressed This Visit        Other    Obesity     Diet and exercise discussed today         Elevated blood pressure reading     BP in 130-140/80-90 today   Patient is to cut back on his caffeine intake with red bull and to sleep 7-8 hours a night which he hasnt been sleeping well consistently   Also to get BP cuff over the counter and check it at home once a week and call me with readings  Refused blood work today             Other Visit Diagnoses     Annual physical exam    -  Primary          Immunizations and preventive care screenings were discussed with patient today  Appropriate education was printed on patient's after visit summary  Counseling:  Alcohol/drug use: discussed moderation in alcohol intake, the recommendations for healthy alcohol use, and avoidance of illicit drug use  Dental Health: discussed importance of regular tooth brushing, flossing, and dental visits  Injury prevention: discussed safety/seat belts, safety helmets, smoke detectors, carbon dioxide detectors, and smoking near bedding or upholstery  Sexual health: discussed sexually transmitted diseases, partner selection, use of condoms, avoidance of unintended pregnancy, and contraceptive alternatives  Exercise: the importance of regular exercise/physical activity was discussed  Recommend exercise 3-5 times per week for at least 30 minutes  BMI Counseling: Body mass index is 31 kg/m²  The BMI is above normal  Nutrition recommendations include decreasing portion sizes and encouraging healthy choices of fruits and vegetables  Exercise recommendations include moderate physical activity 150 minutes/week  No pharmacotherapy was ordered  Rationale for BMI follow-up plan is due to patient being overweight or obese  Depression Screening and Follow-up Plan: Patient was screened for depression during today's encounter  They screened negative with a PHQ-2 score of 0  No follow-ups on file  Chief Complaint:     Chief Complaint   Patient presents with    Physical Exam     Patient being seen for physical       History of Present Illness:     Adult Annual Physical   Patient here for a comprehensive physical exam  The patient reports no problems  Diet and Physical Activity  Diet/Nutrition: well balanced diet and consuming 3-5 servings of fruits/vegetables daily  Exercise: moderate cardiovascular exercise  Depression Screening  PHQ-2/9 Depression Screening    Little interest or pleasure in doing things: 0 - not at all  Feeling down, depressed, or hopeless: 0 - not at all  PHQ-2 Score: 0  PHQ-2 Interpretation: Negative depression screen       General Health  Sleep: sleeps well and gets 7-8 hours of sleep on average  Hearing: normal - bilateral   Vision: goes for regular eye exams  Dental: regular dental visits   Health  History of STDs?: no      Review of Systems:     Review of Systems   Constitutional: Negative  HENT: Negative  Eyes: Negative  Respiratory: Negative  Cardiovascular: Negative  Gastrointestinal: Negative  Endocrine: Negative  Genitourinary: Negative  Musculoskeletal: Negative  Skin: Negative  Allergic/Immunologic: Negative  Neurological: Negative  Hematological: Negative  Psychiatric/Behavioral: Negative         Past Medical History:     Past Medical History:   Diagnosis Date    Anxiety     "due to surgery"    Left knee injury     "playing football at practice"    Left knee pain     occas    Wears glasses     and also contacts      Past Surgical History:     Past Surgical History:   Procedure Laterality Date    NO PAST SURGERIES      AR KNEE SCOPE,AID ANT CRUCIATE REPAIR Left 1/21/2021    Procedure: ARTHROSCOPIC RECONSTRUCTION ANTERIOR CRUCIATE LIGAMENT (ACL) WITH BTB AUTOGRAFT; MENISCUS REPAIR;  Surgeon: Anh Andrews MD;  Location: [facility];  Service: Orthopedics      Social History:     Social History     Socioeconomic History    Marital status: Single     Spouse name: None    Number of children: None    Years of education: None    Highest education level: None   Occupational History    None   Tobacco Use    Smoking status: Never Smoker    Smokeless tobacco: Never Used   Vaping Use    Vaping Use: Never used   Substance and Sexual Activity    Alcohol use: No    Drug use: No    Sexual activity: None   Other Topics Concern    None   Social History Narrative    None     Social Determinants of Health     Financial Resource Strain: Not on file   Food Insecurity: Not on file   Transportation Needs: Not on file   Physical Activity: Not on file   Stress: Not on file   Social Connections: Not on file   Intimate Partner Violence: Not on file   Housing Stability: Not on file      Family History:     Family History   Problem Relation Age of Onset    No Known Problems Mother     No Known Problems Father     Breast cancer Maternal Grandmother       Current Medications:     No current outpatient medications on file  No current facility-administered medications for this visit  Allergies: Allergies   Allergen Reactions    Nuts - Food Allergy      almonds    Other Itching     [facility] - [id number]: peaches only  Persimmon- itching    Prunus Persica Hives     Peaches  Also Chick Peas,,  Hummus, mouth itching      Physical Exam:     /82 (BP Location: Left arm, Patient Position: Sitting, Cuff Size: Standard)   Pulse 74   Temp (!) 96 9 °F (36 1 °C) (Tympanic)   Resp 12   Ht 5' 8 98" (1 752 m)   Wt 95 2 kg (209 lb 12 8 oz)   SpO2 99%   BMI 31 00 kg/m²     Physical Exam  Vitals and nursing note reviewed  Constitutional:       Appearance: Normal appearance  He is well-developed     HENT:      Head: Normocephalic and atraumatic  Right Ear: Tympanic membrane normal       Left Ear: Tympanic membrane normal       Nose: Nose normal    Eyes:      Pupils: Pupils are equal, round, and reactive to light  Cardiovascular:      Rate and Rhythm: Normal rate and regular rhythm  Pulmonary:      Effort: Pulmonary effort is normal       Breath sounds: Normal breath sounds  Abdominal:      Palpations: Abdomen is soft  Musculoskeletal:         General: Normal range of motion  Cervical back: Normal range of motion and neck supple  Skin:     General: Skin is warm  Capillary Refill: Capillary refill takes less than 2 seconds  Neurological:      General: No focal deficit present  Mental Status: He is alert and oriented to person, place, and time     Psychiatric:         Mood and Affect: Mood normal          Behavior: Behavior normal           Damon Gallo MD   [address] [facility]

## 2022-08-08 NOTE — PATIENT INSTRUCTIONS

## 2022-08-08 NOTE — ASSESSMENT & PLAN NOTE
BP in 130-140/80-90 today   Patient is to cut back on his caffeine intake with red bull and to sleep 7-8 hours a night which he hasnt been sleeping well consistently   Also to get BP cuff over the counter and check it at home once a week and call me with readings  Refused blood work today

## 2023-05-02 ENCOUNTER — OFFICE VISIT (OUTPATIENT)
Dept: FAMILY MEDICINE CLINIC | Facility: CLINIC | Age: 23
End: 2023-05-02

## 2023-05-02 VITALS
OXYGEN SATURATION: 98 % | DIASTOLIC BLOOD PRESSURE: 84 MMHG | BODY MASS INDEX: 30.24 KG/M2 | HEART RATE: 72 BPM | HEIGHT: 69 IN | WEIGHT: 204.2 LBS | TEMPERATURE: 97.9 F | SYSTOLIC BLOOD PRESSURE: 122 MMHG

## 2023-05-02 DIAGNOSIS — J02.9 SORE THROAT: ICD-10-CM

## 2023-05-02 DIAGNOSIS — B34.9 VIRAL ILLNESS: ICD-10-CM

## 2023-05-02 DIAGNOSIS — R43.0 LOSS OF SENSE OF SMELL: Primary | ICD-10-CM

## 2023-05-02 LAB — S PYO AG THROAT QL: NEGATIVE

## 2023-05-02 RX ORDER — BROMPHENIRAMINE MALEATE, PSEUDOEPHEDRINE HYDROCHLORIDE, AND DEXTROMETHORPHAN HYDROBROMIDE 2; 30; 10 MG/5ML; MG/5ML; MG/5ML
5 SYRUP ORAL 4 TIMES DAILY PRN
Qty: 200 ML | Refills: 0 | Status: SHIPPED | OUTPATIENT
Start: 2023-05-02

## 2023-05-02 NOTE — PROGRESS NOTES
"Name: Rebeca Rodrigues      : 2000      MRN: [id number]  Encounter Provider: Samantha Diaz MD  Encounter Date: 2023   Encounter department: [facility]     1  Loss of sense of smell  -     COVID Only- Office Collect    2  Sore throat  -     POCT rapid strepA  -     Throat culture; Future    3  Viral illness  -     brompheniramine-pseudoephedrine-DM 30-2-10 MG/5ML syrup; Take 5 mL by mouth 4 (four) times a day as needed for cough         Subjective    loss of smell and taste this am     Sore Throat   This is a new problem  The current episode started in the past 7 days  There has been no fever  Associated symptoms include congestion and coughing  Pertinent negatives include no abdominal pain, diarrhea, drooling, ear discharge, ear pain, headaches, hoarse voice, plugged ear sensation, neck pain, stridor, swollen glands, trouble swallowing or vomiting  He has had no exposure to strep or mono  Review of Systems   HENT: Positive for congestion  Negative for drooling, ear discharge, ear pain, hoarse voice and trouble swallowing  Respiratory: Positive for cough  Negative for stridor  Gastrointestinal: Negative for abdominal pain, diarrhea and vomiting  Musculoskeletal: Negative for neck pain  Neurological: Negative for headaches  No current outpatient medications on file prior to visit  Objective     /84 (BP Location: Left arm, Patient Position: Sitting, Cuff Size: Standard)   Pulse 72   Temp 97 9 °F (36 6 °C) (Temporal)   Ht 5' 8 98\" (1 752 m)   Wt 92 6 kg (204 lb 3 2 oz)   SpO2 98%   BMI 30 17 kg/m²     Physical Exam  Constitutional:       Appearance: He is well-developed  HENT:      Right Ear: Tympanic membrane normal       Left Ear: Tympanic membrane normal       Nose: Congestion present  Mouth/Throat:      Mouth: No oral lesions  Pharynx: No pharyngeal swelling  Tonsils: No tonsillar exudate  2+ on the right   1+ on " the left  Cardiovascular:      Rate and Rhythm: Normal rate and regular rhythm  Musculoskeletal:      Cervical back: Normal range of motion  Neurological:      Mental Status: He is alert         Pascual Huerta MD

## 2023-05-03 LAB — SARS-COV-2 RNA RESP QL NAA+PROBE: NEGATIVE

## 2023-05-04 LAB — BACTERIA THROAT CULT: NORMAL

## 2023-08-07 ENCOUNTER — OFFICE VISIT (OUTPATIENT)
Dept: FAMILY MEDICINE CLINIC | Facility: CLINIC | Age: 23
End: 2023-08-07
Payer: COMMERCIAL

## 2023-08-07 VITALS
HEART RATE: 76 BPM | BODY MASS INDEX: 29.95 KG/M2 | TEMPERATURE: 98.3 F | SYSTOLIC BLOOD PRESSURE: 140 MMHG | DIASTOLIC BLOOD PRESSURE: 98 MMHG | RESPIRATION RATE: 18 BRPM | OXYGEN SATURATION: 99 % | HEIGHT: 69 IN | WEIGHT: 202.2 LBS

## 2023-08-07 DIAGNOSIS — R03.0 ELEVATED BLOOD PRESSURE READING: ICD-10-CM

## 2023-08-07 DIAGNOSIS — Z11.4 ENCOUNTER FOR SCREENING FOR HIV: ICD-10-CM

## 2023-08-07 DIAGNOSIS — Z00.00 ANNUAL PHYSICAL EXAM: Primary | ICD-10-CM

## 2023-08-07 PROCEDURE — 99395 PREV VISIT EST AGE 18-39: CPT | Performed by: FAMILY MEDICINE

## 2023-08-07 NOTE — PROGRESS NOTES
[facility]    NAME: Lincoln Pierce  AGE: 26 y.o. SEX: male  : 2000     DATE: 2023     Assessment and Plan:     Problem List Items Addressed This Visit        Other    Elevated blood pressure reading     Didn't do his BP readings at home   To check it once a week and send me a message on my chart  To cut back on salt   To limit to 2 gram of salt/day   RTC in 6 months          Relevant Orders    ALDOSTERONE/PLASMA RENIN ACTIVITY RATIO,LC/MS/MS    TSH, 3rd generation with Free T4 reflex   Other Visit Diagnoses     Annual physical exam    -  Primary    Relevant Orders    CBC and differential    Comprehensive metabolic panel    Lipid panel    Encounter for screening for HIV        Relevant Orders    HIV 1/2 AG/AB w Reflex [facility] for 2 yr old and above          Immunizations and preventive care screenings were discussed with patient today. Appropriate education was printed on patient's after visit summary. Counseling:  Alcohol/drug use: discussed moderation in alcohol intake, the recommendations for healthy alcohol use, and avoidance of illicit drug use. Dental Health: discussed importance of regular tooth brushing, flossing, and dental visits. Injury prevention: discussed safety/seat belts, safety helmets, smoke detectors, carbon dioxide detectors, and smoking near bedding or upholstery. Sexual health: discussed sexually transmitted diseases, partner selection, use of condoms, avoidance of unintended pregnancy, and contraceptive alternatives. Exercise: the importance of regular exercise/physical activity was discussed. Recommend exercise 3-5 times per week for at least 30 minutes. BMI Counseling: Body mass index is 29.95 kg/m². The BMI is above normal. Nutrition recommendations include decreasing portion sizes and encouraging healthy choices of fruits and vegetables.  Exercise recommendations include moderate physical activity 150 minutes/week. No pharmacotherapy was ordered. Rationale for BMI follow-up plan is due to patient being overweight or obese. No follow-ups on file. Chief Complaint:     Chief Complaint   Patient presents with   • Physical Exam     Patient being seen for Physical Exam-C/O Right wrist pain -lifts heavy weight for work      History of Present Illness:     Adult Annual Physical   Patient here for a comprehensive physical exam. The patient reports problems - Patient being seen for Physical Exam-C/O Right wrist pain -lifts heavy weight for work). Last year in [facility] and Physical Activity  Diet/Nutrition: well balanced diet and consuming 3-5 servings of fruits/vegetables daily. Exercise: moderate cardiovascular exercise. Depression Screening  PHQ-2/9 Depression Screening         General Health  Sleep: sleeps well and gets 7-8 hours of sleep on average. Hearing: normal - bilateral.  Vision: goes for regular eye exams. Waiting for contacts   Dental: regular dental visits and brushes teeth twice daily.  Health  History of STDs?: no.     Review of Systems:     Review of Systems   Constitutional: Negative. HENT: Negative. Eyes: Negative. Respiratory: Negative. Cardiovascular: Negative. Gastrointestinal: Negative. Endocrine: Negative. Genitourinary: Negative. Musculoskeletal: Negative. Skin: Negative. Allergic/Immunologic: Negative. Neurological: Negative. Hematological: Negative. Psychiatric/Behavioral: Negative.        Past Medical History:     Past Medical History:   Diagnosis Date   • Anxiety     "due to surgery"   • Left knee injury     "playing football at practice"   • Left knee pain     occas   • Wears glasses     and also contacts      Past Surgical History:     Past Surgical History:   Procedure Laterality Date   • NO PAST SURGERIES     • [place] ARTHRS AIDED ANT CRUCIATE LIGM RPR/AGMNTJ/RCNSTJ Left 1/21/2021 Procedure: ARTHROSCOPIC RECONSTRUCTION ANTERIOR CRUCIATE LIGAMENT (ACL) WITH BTB AUTOGRAFT; MENISCUS REPAIR;  Surgeon: Dariusz Andujar MD;  Location: [facility];  Service: Orthopedics      Social History:     Social History     Socioeconomic History   • Marital status: Single     Spouse name: None   • Number of children: None   • Years of education: None   • Highest education level: None   Occupational History   • None   Tobacco Use   • Smoking status: Never   • Smokeless tobacco: Never   Vaping Use   • Vaping Use: Never used   Substance and Sexual Activity   • Alcohol use: Yes     Comment: socially   • Drug use: No   • Sexual activity: None   Other Topics Concern   • None   Social History Narrative   • None     Social Determinants of Health     Financial Resource Strain: Not on file   Food Insecurity: Not on file   Transportation Needs: Not on file   Physical Activity: Not on file   Stress: Not on file   Social Connections: Not on file   Intimate Partner Violence: Not on file   Housing Stability: Not on file      Family History:     Family History   Problem Relation Age of Onset   • No Known Problems Mother    • No Known Problems Father    • Breast cancer Maternal Grandmother       Current Medications:     Current Outpatient Medications   Medication Sig Dispense Refill   • brompheniramine-pseudoephedrine-DM 30-2-10 MG/5ML syrup Take 5 mL by mouth 4 (four) times a day as needed for cough (Patient not taking: Reported on 8/7/2023) 200 mL 0     No current facility-administered medications for this visit. Allergies:      Allergies   Allergen Reactions   • Nuts - Food Allergy      almonds   • Other Itching     Annotation - [id number]: peaches only  Persimmon- itching   • Prunus Persica Hives     Peaches  Also Chick Peas,,  Hummus, mouth itching      Physical Exam:     /98 (BP Location: Left arm, Patient Position: Sitting, Cuff Size: Standard)   Pulse 76   Temp 98.3 °F (36.8 °C) (Tympanic)   Resp 18   Ht 5' 8.9" (1.75 m)   Wt 91.7 kg (202 lb 3.2 oz)   SpO2 99%   BMI 29.95 kg/m²     Physical Exam  Constitutional:       Appearance: Normal appearance. He is well-developed. HENT:      Head: Normocephalic and atraumatic. Right Ear: Tympanic membrane normal.      Left Ear: Tympanic membrane normal.      Nose: Nose normal.      Mouth/Throat:      Mouth: Mucous membranes are moist.   Eyes:      Pupils: Pupils are equal, round, and reactive to light. Cardiovascular:      Rate and Rhythm: Normal rate and regular rhythm. Pulmonary:      Effort: Pulmonary effort is normal.      Breath sounds: Normal breath sounds. Abdominal:      Palpations: Abdomen is soft. Musculoskeletal:         General: Normal range of motion. Cervical back: Normal range of motion and neck supple. Skin:     General: Skin is warm. Capillary Refill: Capillary refill takes less than 2 seconds. Neurological:      General: No focal deficit present. Mental Status: He is alert and oriented to person, place, and time.    Psychiatric:         Mood and Affect: Mood normal.         Behavior: Behavior normal.          Mariia Banda MD   [address] [place] [address]

## 2023-08-07 NOTE — ASSESSMENT & PLAN NOTE
Didn't do his BP readings at home   To check it once a week and send me a message on my chart  To cut back on salt   To limit to 2 gram of salt/day   RTC in 6 months

## 2023-12-13 ENCOUNTER — OFFICE VISIT (OUTPATIENT)
Dept: FAMILY MEDICINE CLINIC | Facility: CLINIC | Age: 23
End: 2023-12-13
Payer: COMMERCIAL

## 2023-12-13 VITALS
TEMPERATURE: 97.3 F | HEIGHT: 69 IN | BODY MASS INDEX: 28.91 KG/M2 | DIASTOLIC BLOOD PRESSURE: 90 MMHG | SYSTOLIC BLOOD PRESSURE: 140 MMHG | RESPIRATION RATE: 17 BRPM | HEART RATE: 80 BPM | WEIGHT: 195.2 LBS | OXYGEN SATURATION: 98 %

## 2023-12-13 DIAGNOSIS — J06.9 ACUTE URI: Primary | ICD-10-CM

## 2023-12-13 DIAGNOSIS — R03.0 ELEVATED BLOOD PRESSURE READING: ICD-10-CM

## 2023-12-13 DIAGNOSIS — R09.89 RUNNY NOSE: ICD-10-CM

## 2023-12-13 PROCEDURE — 99213 OFFICE O/P EST LOW 20 MIN: CPT | Performed by: FAMILY MEDICINE

## 2023-12-13 PROCEDURE — 87635 SARS-COV-2 COVID-19 AMP PRB: CPT | Performed by: FAMILY MEDICINE

## 2023-12-13 NOTE — PROGRESS NOTES
Name: Tracie Arciniega      : 2000      MRN: [id number]  Encounter Provider: Trever Chin MD  Encounter Date: 2023   Encounter department: [facility]     1. Acute URI  Comments:  most likely viral   supportive care  hydration   OTC meds without decongestant due to high BP    2. Runny nose  -     COVID Only- Office Collect    3. Elevated blood pressure reading  Assessment & Plan:  BP at home has been and 120-130/70-80  To get labs done asap  To cut back on salt             Subjective     Cough  This is a new problem. Episode onset: 2 days ago. The problem occurs every few minutes. The cough is Non-productive. Associated symptoms include nasal congestion and postnasal drip. Pertinent negatives include no chest pain, chills, ear congestion, ear pain, fever, headaches, heartburn, hemoptysis, myalgias, rash, rhinorrhea, sore throat, shortness of breath, sweats, weight loss or wheezing. Nothing aggravates the symptoms. He has tried nothing for the symptoms. The treatment provided no relief. There is no history of asthma, bronchiectasis, bronchitis, COPD, emphysema, environmental allergies or pneumonia. Review of Systems   Constitutional:  Negative for chills, fever and weight loss. HENT:  Positive for postnasal drip. Negative for ear pain, rhinorrhea and sore throat. Respiratory:  Positive for cough. Negative for hemoptysis, shortness of breath and wheezing. Cardiovascular:  Negative for chest pain. Gastrointestinal:  Negative for heartburn. Musculoskeletal:  Negative for myalgias. Skin:  Negative for rash. Allergic/Immunologic: Negative for environmental allergies. Neurological:  Negative for headaches.        Past Medical History:   Diagnosis Date   • Anxiety     "due to surgery"   • Left knee injury     "playing football at practice"   • Left knee pain     occas   • Wears glasses     and also contacts     Past Surgical History:   Procedure Laterality Date   • NO PAST SURGERIES     • [place] ARTHRS AIDED ANT CRUCIATE LIGM RPR/AGMNTJ/RCNSTJ Left 1/21/2021    Procedure: ARTHROSCOPIC RECONSTRUCTION ANTERIOR CRUCIATE LIGAMENT (ACL) WITH BTB AUTOGRAFT; MENISCUS REPAIR;  Surgeon: Juanjo Hardin MD;  Location: [facility];  Service: Orthopedics     Family History   Problem Relation Age of Onset   • No Known Problems Mother    • No Known Problems Father    • Breast cancer Maternal Grandmother      Social History     Socioeconomic History   • Marital status: Single     Spouse name: None   • Number of children: None   • Years of education: None   • Highest education level: None   Occupational History   • None   Tobacco Use   • Smoking status: Never   • Smokeless tobacco: Never   Vaping Use   • Vaping status: Never Used   Substance and Sexual Activity   • Alcohol use: Yes     Comment: socially   • Drug use: No   • Sexual activity: Yes     Partners: Female   Other Topics Concern   • None   Social History Narrative   • None     Social Determinants of Health     Financial Resource Strain: Not on file   Food Insecurity: Not on file   Transportation Needs: Not on file   Physical Activity: Not on file   Stress: Not on file   Social Connections: Not on file   Intimate Partner Violence: Not on file   Housing Stability: Not on file     Current Outpatient Medications on File Prior to Visit   Medication Sig   • [DISCONTINUED] brompheniramine-pseudoephedrine-DM 30-2-10 MG/5ML syrup Take 5 mL by mouth 4 (four) times a day as needed for cough (Patient not taking: Reported on 8/7/2023)     Allergies   Allergen Reactions   • Nuts - Food Allergy      almonds   • Other Itching     Annotation - [id number]: peaches only  Persimmon- itching   • Prunus Persica Hives     Peaches  Also Chick Peas,,  Hummus, mouth itching     Immunization History   Administered Date(s) Administered   • DTaP 5 2000, 2000, 09/03/2004, 03/18/2005, 07/07/2006   • Hep A, ped/adol, 2 dose 07/14/2017, 01/15/2018   • Hep B, adult 2000, 2000, 09/03/2004   • Hepatitis A 07/14/2017, 01/15/2018   • Hib (PRP-OMP) 2000, 2000, 09/03/2004   • INFLUENZA 10/27/2009, 11/04/2011   • IPV 2000, 2000, 09/03/2004, 07/07/2006   • Influenza Quadrivalent Preservative Free 3 years and older IM 01/13/2015   • Influenza, seasonal, injectable 12/11/2007   • MMR 08/14/2003, 07/07/2006   • Meningococcal MCV4P 08/03/2020   • Meningococcal, Unknown Serogroups 01/13/2015   • Pneumococcal Conjugate 13-Valent 2002, 08/14/2003   • Tdap 01/13/2015, 12/23/2016   • Varicella 08/14/2003, 12/11/2007       Objective     /90 (BP Location: Left arm, Patient Position: Sitting, Cuff Size: Standard)   Pulse 80   Temp (!) 97.3 °F (36.3 °C) (Tympanic)   Resp 17   Ht 5' 8.9" (1.75 m)   Wt 88.5 kg (195 lb 3.2 oz)   SpO2 98%   BMI 28.91 kg/m²     Physical Exam  Constitutional:       Appearance: Normal appearance. HENT:      Right Ear: There is no impacted cerumen. Nose: Nose normal. No congestion or rhinorrhea. Mouth/Throat:      Mouth: Mucous membranes are moist.   Eyes:      Extraocular Movements: Extraocular movements intact. Pupils: Pupils are equal, round, and reactive to light. Cardiovascular:      Rate and Rhythm: Normal rate and regular rhythm. Pulses: Normal pulses. Heart sounds: Normal heart sounds. Pulmonary:      Effort: Pulmonary effort is normal. No respiratory distress. Breath sounds: Normal breath sounds. Musculoskeletal:      Cervical back: Normal range of motion. Neurological:      Mental Status: He is alert.        Candelaria Valdez MD

## 2023-12-14 LAB — SARS-COV-2 RNA RESP QL NAA+PROBE: NEGATIVE

## 2024-02-05 ENCOUNTER — OFFICE VISIT (OUTPATIENT)
Dept: FAMILY MEDICINE CLINIC | Facility: CLINIC | Age: 24
End: 2024-02-05
Payer: COMMERCIAL

## 2024-02-05 ENCOUNTER — APPOINTMENT (OUTPATIENT)
Dept: LAB | Facility: CLINIC | Age: 24
End: 2024-02-05
Payer: COMMERCIAL

## 2024-02-05 VITALS
HEART RATE: 87 BPM | TEMPERATURE: 97.8 F | SYSTOLIC BLOOD PRESSURE: 140 MMHG | WEIGHT: 198.6 LBS | BODY MASS INDEX: 29.41 KG/M2 | RESPIRATION RATE: 17 BRPM | OXYGEN SATURATION: 98 % | HEIGHT: 69 IN | DIASTOLIC BLOOD PRESSURE: 80 MMHG

## 2024-02-05 DIAGNOSIS — E66.3 OVER WEIGHT: ICD-10-CM

## 2024-02-05 DIAGNOSIS — R03.0 ELEVATED BLOOD PRESSURE READING: Primary | ICD-10-CM

## 2024-02-05 DIAGNOSIS — R03.0 ELEVATED BLOOD PRESSURE READING: ICD-10-CM

## 2024-02-05 DIAGNOSIS — Z11.4 ENCOUNTER FOR SCREENING FOR HIV: ICD-10-CM

## 2024-02-05 LAB
ALBUMIN SERPL BCP-MCNC: 4.4 G/DL (ref 3.5–5)
ALP SERPL-CCNC: 56 U/L (ref 34–104)
ALT SERPL W P-5'-P-CCNC: 19 U/L (ref 7–52)
ANION GAP SERPL CALCULATED.3IONS-SCNC: 4 MMOL/L
AST SERPL W P-5'-P-CCNC: 25 U/L (ref 13–39)
BASOPHILS # BLD AUTO: 0.02 THOUSANDS/ÂΜL (ref 0–0.1)
BASOPHILS NFR BLD AUTO: 0 % (ref 0–1)
BILIRUB SERPL-MCNC: 0.51 MG/DL (ref 0.2–1)
BUN SERPL-MCNC: 10 MG/DL (ref 5–25)
CALCIUM SERPL-MCNC: 9.1 MG/DL (ref 8.4–10.2)
CHLORIDE SERPL-SCNC: 106 MMOL/L (ref 96–108)
CHOLEST SERPL-MCNC: 141 MG/DL
CO2 SERPL-SCNC: 30 MMOL/L (ref 21–32)
CREAT SERPL-MCNC: 0.95 MG/DL (ref 0.6–1.3)
EOSINOPHIL # BLD AUTO: 0.09 THOUSAND/ÂΜL (ref 0–0.61)
EOSINOPHIL NFR BLD AUTO: 2 % (ref 0–6)
ERYTHROCYTE [DISTWIDTH] IN BLOOD BY AUTOMATED COUNT: 13.7 % (ref 11.6–15.1)
GFR SERPL CREATININE-BSD FRML MDRD: 113 ML/MIN/1.73SQ M
GLUCOSE P FAST SERPL-MCNC: 83 MG/DL (ref 65–99)
HCT VFR BLD AUTO: 45.5 % (ref 36.5–49.3)
HDLC SERPL-MCNC: 57 MG/DL
HGB BLD-MCNC: 15.6 G/DL (ref 12–17)
HIV 1+2 AB+HIV1 P24 AG SERPL QL IA: NORMAL
HIV 2 AB SERPL QL IA: NORMAL
HIV1 AB SERPL QL IA: NORMAL
HIV1 P24 AG SERPL QL IA: NORMAL
IMM GRANULOCYTES # BLD AUTO: 0.02 THOUSAND/UL (ref 0–0.2)
IMM GRANULOCYTES NFR BLD AUTO: 0 % (ref 0–2)
LDLC SERPL CALC-MCNC: 73 MG/DL (ref 0–100)
LYMPHOCYTES # BLD AUTO: 2.13 THOUSANDS/ÂΜL (ref 0.6–4.47)
LYMPHOCYTES NFR BLD AUTO: 40 % (ref 14–44)
MCH RBC QN AUTO: 31 PG (ref 26.8–34.3)
MCHC RBC AUTO-ENTMCNC: 34.3 G/DL (ref 31.4–37.4)
MCV RBC AUTO: 91 FL (ref 82–98)
MONOCYTES # BLD AUTO: 0.58 THOUSAND/ÂΜL (ref 0.17–1.22)
MONOCYTES NFR BLD AUTO: 11 % (ref 4–12)
NEUTROPHILS # BLD AUTO: 2.44 THOUSANDS/ÂΜL (ref 1.85–7.62)
NEUTS SEG NFR BLD AUTO: 47 % (ref 43–75)
NONHDLC SERPL-MCNC: 84 MG/DL
NRBC BLD AUTO-RTO: 0 /100 WBCS
PLATELET # BLD AUTO: 262 THOUSANDS/UL (ref 149–390)
PMV BLD AUTO: 9.7 FL (ref 8.9–12.7)
POTASSIUM SERPL-SCNC: 4.1 MMOL/L (ref 3.5–5.3)
PROT SERPL-MCNC: 7 G/DL (ref 6.4–8.4)
RBC # BLD AUTO: 5.03 MILLION/UL (ref 3.88–5.62)
SODIUM SERPL-SCNC: 140 MMOL/L (ref 135–147)
TRIGL SERPL-MCNC: 56 MG/DL
TSH SERPL DL<=0.05 MIU/L-ACNC: 0.9 UIU/ML (ref 0.45–4.5)
WBC # BLD AUTO: 5.28 THOUSAND/UL (ref 4.31–10.16)

## 2024-02-05 PROCEDURE — 36415 COLL VENOUS BLD VENIPUNCTURE: CPT

## 2024-02-05 PROCEDURE — 82088 ASSAY OF ALDOSTERONE: CPT

## 2024-02-05 PROCEDURE — 99214 OFFICE O/P EST MOD 30 MIN: CPT | Performed by: FAMILY MEDICINE

## 2024-02-05 PROCEDURE — 84443 ASSAY THYROID STIM HORMONE: CPT

## 2024-02-05 PROCEDURE — 84244 ASSAY OF RENIN: CPT

## 2024-02-05 PROCEDURE — 87389 HIV-1 AG W/HIV-1&-2 AB AG IA: CPT

## 2024-02-05 NOTE — ASSESSMENT & PLAN NOTE
Due for labs   To stop his pre work out supplements  To cut back on salt in his diet  To check BP at home to r/o white coat hypertension  He will go for labs today   Referral to cardiology

## 2024-02-05 NOTE — PROGRESS NOTES
"Name: Trever Gomez      : 2000      MRN: [id number]  Encounter Provider: Kristi Jensen MD  Encounter Date: 2024   [facility]    Assessment & Plan     1. Elevated blood pressure reading  Assessment & Plan:  Due for labs   To stop his pre work out supplements  To cut back on salt in his diet  To check BP at home to r/o white coat hypertension  He will go for labs today   Referral to cardiology      Orders:  -     Ambulatory Referral to Cardiology; Future    2. Over weight  Assessment & Plan:  Diet, exercise, portion control             Subjective     HPI  Here for follow up  Ate a lot of salty snacks at work party last night   Has been taking pre work out 3 times a week which has 200 mg CAFFEINE/serving  Denies any chest pain/shortness of breath/swelling of his legs  He is able to work out and run at Gym without any issues       Review of Systems   Constitutional: Negative.    HENT: Negative.     Eyes: Negative.    Respiratory: Negative.     Cardiovascular: Negative.    Endocrine: Negative.    Genitourinary: Negative.    Musculoskeletal: Negative.    Allergic/Immunologic: Negative.  Negative for immunocompromised state.   Neurological: Negative.    Hematological: Negative.    Psychiatric/Behavioral: Negative.         Past Medical History:   Diagnosis Date   • Anxiety     \"due to surgery\"   • Left knee injury     \"playing football at practice\"   • Left knee pain     occas   • Wears glasses     and also contacts     Past Surgical History:   Procedure Laterality Date   • NO PAST SURGERIES     • [place] ARTHRS AIDED ANT CRUCIATE LIGM RPR/AGMNTJ/RCNSTJ Left 2021    Procedure: ARTHROSCOPIC RECONSTRUCTION ANTERIOR CRUCIATE LIGAMENT (ACL) WITH BTB AUTOGRAFT; MENISCUS REPAIR;  Surgeon: Clay Eduardo MD;  Location: [facility] OR;  Service: Orthopedics     Family History   Problem Relation Age of Onset   • No Known Problems Mother    • No Known Problems Father    • Breast " cancer Maternal Grandmother      Social History     Socioeconomic History   • Marital status: Single     Spouse name: None   • Number of children: None   • Years of education: None   • Highest education level: None   Occupational History   • None   Tobacco Use   • Smoking status: Never   • Smokeless tobacco: Never   Vaping Use   • Vaping status: Never Used   Substance and Sexual Activity   • Alcohol use: Yes     Comment: socially   • Drug use: No   • Sexual activity: Yes     Partners: Female   Other Topics Concern   • None   Social History Narrative   • None     Social Determinants of Health     Financial Resource Strain: Not on file   Food Insecurity: Not on file   Transportation Needs: Not on file   Physical Activity: Not on file   Stress: Not on file   Social Connections: Not on file   Intimate Partner Violence: Not on file   Housing Stability: Not on file     No current outpatient medications on file prior to visit.     Allergies   Allergen Reactions   • Nuts - Food Allergy      almonds   • Other Itching     Annotation - 13Jan2015: peaches only  Persimmon- itching   • Prunus Persica Hives     Peaches  Also Chick Peas,,  Hummus, mouth itching     Immunization History   Administered Date(s) Administered   • COVID-19 PFIZER VACCINE 0.3 ML IM 04/17/2021, 05/08/2021   • DTaP 5 2002, 2002, 09/03/2004, 03/18/2005, 07/07/2006   • Hep A, ped/adol, 2 dose 07/14/2017, 01/15/2018   • Hep B, adult 2000, 2002, 09/03/2004   • Hepatitis A 07/14/2017, 01/15/2018   • Hib (PRP-OMP) 2002, 2002, 09/03/2004   • INFLUENZA 10/27/2009, 11/04/2011   • IPV 2002, 2002, 09/03/2004, 07/07/2006   • Influenza Quadrivalent Preservative Free 3 years and older IM 01/13/2015   • Influenza, seasonal, injectable 12/11/2007   • MMR 08/14/2003, 07/07/2006   • Meningococcal MCV4P 08/03/2020   • Meningococcal, Unknown Serogroups 01/13/2015   • Pneumococcal Conjugate 13-Valent 2002, 08/14/2003   •  "Tdap 01/13/2015, 12/23/2016   • Varicella 08/14/2003, 12/11/2007       Objective     /80 (BP Location: Left arm, Patient Position: Sitting, Cuff Size: Standard)   Pulse 87   Temp 97.8 °F (36.6 °C) (Tympanic)   Resp 17   Ht 5' 8.9\" (1.75 m)   Wt 90.1 kg (198 lb 9.6 oz)   SpO2 98%   BMI 29.41 kg/m²     Physical Exam  Vitals and nursing note reviewed.   Constitutional:       Appearance: Normal appearance.   HENT:      Head: Normocephalic.      Nose: Nose normal.      Mouth/Throat:      Mouth: Mucous membranes are moist.   Cardiovascular:      Rate and Rhythm: Normal rate and regular rhythm.      Pulses: Normal pulses.      Heart sounds: Normal heart sounds.   Pulmonary:      Effort: Pulmonary effort is normal.   Skin:     Capillary Refill: Capillary refill takes less than 2 seconds.   Neurological:      General: No focal deficit present.      Mental Status: He is alert and oriented to person, place, and time.   Psychiatric:         Mood and Affect: Mood normal.         Behavior: Behavior normal.       Kristi Jensen MD    "

## 2024-02-14 LAB
ALDOST SERPL-MCNC: 2.4 NG/DL (ref 0–30)
ALDOST/RENIN PLAS-RTO: 3.5 {RATIO} (ref 0–30)
RENIN PLAS-CCNC: 0.68 NG/ML/HR (ref 0.17–5.38)

## 2025-02-28 NOTE — PROGRESS NOTES
Daily Note     Today's date: 2021  Patient name: Ivana Reed  : 2000  MRN: [id number]  Referring provider: Chica Casas, *  Dx:   Encounter Diagnosis     ICD-10-CM    1  Rupture of anterior cruciate ligament of left knee, initial encounter  S83 512A              1 on 1 PT for entirety     Subjective: Patient reports medial knee pain  States that he noticed pain while descending stairs  Objective: See treatment diary below  Assessment: Tolerated treatment well  Patient reports that he just completed a LE strengthening program with [employer] on campus  Therefore, focused today's session on agility and plyometrics  Patient 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 from continued PT  Plan: Continue per plan of care           Precautions: ACL reconstruction, BPTB,  meniscal repair, smyth ROM to 90 first 4 weeks      Manuals    PROM        Quad stretch        IASTM     Pes anserine EB   Scar massage        KT tape        MFD        EPAT        Neuro Re-Ed        squat        lunge        Lateral step over bosu ball        bosu ball  Squat and hold  4 x 1'  4 x 1'     Mini squat        Step up        Legpress 170# 5 x 8 280# 5 x 5 280# 4 x 5  300# 1 x 5     SLS        Star slider        Lateral step down        [place] hamstring curls        Pistol squat with TRX         Single leg hip abd mira        Assisted Sissy Squats        Back squat  3 x 12, bar +45 135# 3 x 12  tempo  Bar + 45 3 x 12   Bird dip         Reverse Lunge R + L, to lateral step up        [place] spilt squat        Step up reverse lunge        Elevated quadruped pull through        Elevated bird dog        Lateral line hops        Sunshine on bosu ball        blazepod  plank focus        Agility hurdles        Split squat jumps        Box jumps        Ther Ex        Bike         Calf stretch heel propped        Quad stretch 4 x 30"   4 x 30"    Hamstring stretch 4 x 30"   4 x 30"    Heel slide        Ankle pumps        LAQ 90-60        HR, TR        Leg press jump        Slump sliders                Slide board        Ther Activity        Line jump        Squat, vertical        TM 3' jog 1' Walk x 4 3' jog 1' Walk x 4 3' jog 1' Walk x 4 3' jog 1' walk x4 1 mi   AlterG running XL short        Vertical alt        Hopping        Blazepod Agility T drill    x6    Athletic Burpee x3  Icky shuffle x 5  Tramp   Sprints x 10s      x5 Rounds 50-60% effort     180* Rotation to Jump x 6    High Plank 30s    x5 rounds     Box jump burpee     Blaze pod x 2   amita     blazepod x 3   Agility ladder     blazepod x 2    Sport cord blazepod  4x   Bear crawl x 4   circuit 5x  RDL 2, 35# KB  3 Consecutive broad jumps  Rest 1-2 Mins      Then   4x  25# [place] KB Swings x10    Max Effort Wall Sit (40 second best effort)    20 Flutter Kicks 3 x step up knee drive 96# DB, split squat 15# DB, split squat jump  Bear crawl     3 x ea      A:  3 x step up knee drive 52# DB, split squat 15# DB, split squat jump  Bear crawl     3 x ea     Gait Training                        Modalities 1+ knee edema as per RN flow sheet

## (undated) DEVICE — GLOVE SRG BIOGEL 8

## (undated) DEVICE — INTENDED FOR TISSUE SEPARATION, AND OTHER PROCEDURES THAT REQUIRE A SHARP SURGICAL BLADE TO PUNCTURE OR CUT.: Brand: BARD-PARKER ® CARBON RIB-BACK BLADES

## (undated) DEVICE — IMPERVIOUS STOCKINETTE: Brand: DEROYAL

## (undated) DEVICE — ADHESIVE SKIN HIGH VISCOSITY EXOFIN 1ML

## (undated) DEVICE — SPONGE LAP 18 X 18 IN STRL RFD

## (undated) DEVICE — GLOVE SRG BIOGEL 7.5

## (undated) DEVICE — TUBING SUCTION 5MM X 12 FT

## (undated) DEVICE — 10FR FRAZIER SUCTION HANDLE: Brand: CARDINAL HEALTH

## (undated) DEVICE — ACE WRAP 6 IN UNSTERILE

## (undated) DEVICE — VAPR COOLPULSE 90 ELECTRODE 90 DEGREES SUCTION WITH INTEGRATED HANDPIECE: Brand: VAPR COOLPULSE

## (undated) DEVICE — SCD SEQUENTIAL COMPRESSION COMFORT SLEEVE MEDIUM KNEE LENGTH: Brand: KENDALL SCD

## (undated) DEVICE — HEAVY DUTY TABLE COVER: Brand: CONVERTORS

## (undated) DEVICE — SYRINGE 20ML LL

## (undated) DEVICE — CHLORAPREP HI-LITE 26ML ORANGE

## (undated) DEVICE — DRESSING MEPILEX AG BORDER 4 X 8 IN

## (undated) DEVICE — REAMER 10MM LOPRFL

## (undated) DEVICE — PLUMEPEN PRO 10FT

## (undated) DEVICE — BLADE SHAVER DISSECTOR 4MM 13CM COOLCUT

## (undated) DEVICE — GAUZE SPONGES,USP TYPE VII GAUZE, 12 PLY: Brand: CURITY

## (undated) DEVICE — BETHLEHEM UNIVERSAL  ARTHRO PK: Brand: CARDINAL HEALTH

## (undated) DEVICE — TIBURON SPLIT SHEET: Brand: CONVERTORS

## (undated) DEVICE — NEEDLE 18 G X 1 1/2

## (undated) DEVICE — BLADE SHAVER DISSECTOR 5MM 13CM COOLCUT

## (undated) DEVICE — SUT STRATAFIX SPIRAL MONOCRYL PLUS 2-0 CT-1 30CM SXMP1B412

## (undated) DEVICE — 3M™ STERI-DRAPE™ U-DRAPE 1015: Brand: STERI-DRAPE™

## (undated) DEVICE — ABDOMINAL PAD: Brand: DERMACEA

## (undated) DEVICE — SUT FIBERWIRE #2 1/2 CIRCLE T-5 38IN AR-7200

## (undated) DEVICE — SUT MONOCRYL 2-0 SH 27 IN Y417H

## (undated) DEVICE — 3M™ IOBAN™ 2 ANTIMICROBIAL INCISE DRAPE 6650EZ: Brand: IOBAN™ 2

## (undated) DEVICE — BLADE SAGITTAL 25.6 X 9.5MM

## (undated) DEVICE — MAYO STAND COVER: Brand: CONVERTORS

## (undated) DEVICE — TUBING EXTENSION 6 FT CONTIN WAVE

## (undated) DEVICE — PUDDLE VAC

## (undated) DEVICE — COBAN 6 IN STERILE

## (undated) DEVICE — CYSTO TUBING TUR Y IRRIGATION

## (undated) DEVICE — GLOVE INDICATOR PI UNDERGLOVE SZ 8.5 BLUE

## (undated) DEVICE — PADDING CAST 6IN COTTON STRL

## (undated) DEVICE — KNOT PUSHER/SUTURE CUTTER W/ PORTAL SKID BUNDLE

## (undated) DEVICE — LIGHT GLOVE GREEN

## (undated) DEVICE — SUT VICRYL 2-0 CT-2 27 IN J269H